# Patient Record
Sex: MALE | Race: BLACK OR AFRICAN AMERICAN | NOT HISPANIC OR LATINO | URBAN - METROPOLITAN AREA
[De-identification: names, ages, dates, MRNs, and addresses within clinical notes are randomized per-mention and may not be internally consistent; named-entity substitution may affect disease eponyms.]

---
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
## 2020-11-03 ENCOUNTER — EMERGENCY (EMERGENCY)
Facility: HOSPITAL | Age: 20
LOS: 0 days | Discharge: HOME | End: 2020-11-03
Attending: EMERGENCY MEDICINE
Payer: MEDICAID

## 2020-11-03 VITALS — SYSTOLIC BLOOD PRESSURE: 157 MMHG | DIASTOLIC BLOOD PRESSURE: 95 MMHG | HEART RATE: 69 BPM

## 2020-11-03 VITALS — WEIGHT: 212.08 LBS | HEIGHT: 69 IN

## 2020-11-03 DIAGNOSIS — R07.89 OTHER CHEST PAIN: ICD-10-CM

## 2020-11-03 DIAGNOSIS — I10 ESSENTIAL (PRIMARY) HYPERTENSION: ICD-10-CM

## 2020-11-03 DIAGNOSIS — G56.21 LESION OF ULNAR NERVE, RIGHT UPPER LIMB: ICD-10-CM

## 2020-11-03 DIAGNOSIS — R07.9 CHEST PAIN, UNSPECIFIED: ICD-10-CM

## 2020-11-03 LAB
ALBUMIN SERPL ELPH-MCNC: 3.6 G/DL — SIGNIFICANT CHANGE UP (ref 3.3–5)
ALP SERPL-CCNC: 53 U/L — SIGNIFICANT CHANGE UP (ref 40–120)
ALT FLD-CCNC: 24 U/L — SIGNIFICANT CHANGE UP (ref 12–78)
ANION GAP SERPL CALC-SCNC: 5 MMOL/L — SIGNIFICANT CHANGE UP (ref 5–17)
APTT BLD: 31.6 SEC — SIGNIFICANT CHANGE UP (ref 27.5–35.5)
AST SERPL-CCNC: 17 U/L — SIGNIFICANT CHANGE UP (ref 15–37)
BASOPHILS # BLD AUTO: 0.02 K/UL — SIGNIFICANT CHANGE UP (ref 0–0.2)
BASOPHILS NFR BLD AUTO: 0.3 % — SIGNIFICANT CHANGE UP (ref 0–2)
BILIRUB SERPL-MCNC: 0.3 MG/DL — SIGNIFICANT CHANGE UP (ref 0.2–1.2)
BUN SERPL-MCNC: 12 MG/DL — SIGNIFICANT CHANGE UP (ref 7–23)
CALCIUM SERPL-MCNC: 8.9 MG/DL — SIGNIFICANT CHANGE UP (ref 8.5–10.1)
CHLORIDE SERPL-SCNC: 106 MMOL/L — SIGNIFICANT CHANGE UP (ref 96–108)
CO2 SERPL-SCNC: 29 MMOL/L — SIGNIFICANT CHANGE UP (ref 22–31)
CREAT SERPL-MCNC: 1.13 MG/DL — SIGNIFICANT CHANGE UP (ref 0.5–1.3)
EOSINOPHIL # BLD AUTO: 0.1 K/UL — SIGNIFICANT CHANGE UP (ref 0–0.5)
EOSINOPHIL NFR BLD AUTO: 1.5 % — SIGNIFICANT CHANGE UP (ref 0–6)
GLUCOSE SERPL-MCNC: 94 MG/DL — SIGNIFICANT CHANGE UP (ref 70–99)
HCT VFR BLD CALC: 38.5 % — LOW (ref 39–50)
HGB BLD-MCNC: 12.6 G/DL — LOW (ref 13–17)
IMM GRANULOCYTES NFR BLD AUTO: 0.3 % — SIGNIFICANT CHANGE UP (ref 0–1.5)
INR BLD: 1.17 RATIO — HIGH (ref 0.88–1.16)
LIDOCAIN IGE QN: 104 U/L — SIGNIFICANT CHANGE UP (ref 73–393)
LYMPHOCYTES # BLD AUTO: 1.53 K/UL — SIGNIFICANT CHANGE UP (ref 1–3.3)
LYMPHOCYTES # BLD AUTO: 23.6 % — SIGNIFICANT CHANGE UP (ref 13–44)
MCHC RBC-ENTMCNC: 28.5 PG — SIGNIFICANT CHANGE UP (ref 27–34)
MCHC RBC-ENTMCNC: 32.7 GM/DL — SIGNIFICANT CHANGE UP (ref 32–36)
MCV RBC AUTO: 87.1 FL — SIGNIFICANT CHANGE UP (ref 80–100)
MONOCYTES # BLD AUTO: 0.43 K/UL — SIGNIFICANT CHANGE UP (ref 0–0.9)
MONOCYTES NFR BLD AUTO: 6.6 % — SIGNIFICANT CHANGE UP (ref 2–14)
NEUTROPHILS # BLD AUTO: 4.39 K/UL — SIGNIFICANT CHANGE UP (ref 1.8–7.4)
NEUTROPHILS NFR BLD AUTO: 67.7 % — SIGNIFICANT CHANGE UP (ref 43–77)
NRBC # BLD: 0 /100 WBCS — SIGNIFICANT CHANGE UP (ref 0–0)
NT-PROBNP SERPL-SCNC: 65 PG/ML — SIGNIFICANT CHANGE UP (ref 0–125)
PLATELET # BLD AUTO: 114 K/UL — LOW (ref 150–400)
POTASSIUM SERPL-MCNC: 3.6 MMOL/L — SIGNIFICANT CHANGE UP (ref 3.5–5.3)
POTASSIUM SERPL-SCNC: 3.6 MMOL/L — SIGNIFICANT CHANGE UP (ref 3.5–5.3)
PROT SERPL-MCNC: 7 GM/DL — SIGNIFICANT CHANGE UP (ref 6–8.3)
PROTHROM AB SERPL-ACNC: 13.5 SEC — SIGNIFICANT CHANGE UP (ref 10.6–13.6)
RBC # BLD: 4.42 M/UL — SIGNIFICANT CHANGE UP (ref 4.2–5.8)
RBC # FLD: 13.7 % — SIGNIFICANT CHANGE UP (ref 10.3–14.5)
SODIUM SERPL-SCNC: 140 MMOL/L — SIGNIFICANT CHANGE UP (ref 135–145)
TROPONIN I SERPL-MCNC: <.015 NG/ML — SIGNIFICANT CHANGE UP (ref 0.01–0.04)
WBC # BLD: 6.49 K/UL — SIGNIFICANT CHANGE UP (ref 3.8–10.5)
WBC # FLD AUTO: 6.49 K/UL — SIGNIFICANT CHANGE UP (ref 3.8–10.5)

## 2020-11-03 PROCEDURE — 93010 ELECTROCARDIOGRAM REPORT: CPT

## 2020-11-03 PROCEDURE — 99285 EMERGENCY DEPT VISIT HI MDM: CPT

## 2020-11-03 PROCEDURE — 71045 X-RAY EXAM CHEST 1 VIEW: CPT | Mod: 26

## 2020-11-03 RX ORDER — AMLODIPINE BESYLATE 2.5 MG/1
1 TABLET ORAL
Qty: 30 | Refills: 0
Start: 2020-11-03 | End: 2020-12-02

## 2020-11-03 NOTE — ED PROVIDER NOTE - CARE PROVIDER_API CALL
Karthik Love  INTERNAL MEDICINE  [address], [address]  [place] [address]  Phone: [phone]  Fax: [phone]  Follow Up Time: 4-6 Days    Zaire Mcintyre  CARDIOLOGY  [address] [facility] [address], [address] [place] [address]  Phone: [phone]  Fax: [phone]  Follow Up Time: 1-3 Days    Ian Kelly  ORTHOPAEDIC SURGERY  [address], [address]  [place] [address]  Phone: [phone]  Fax: [phone]  Follow Up Time: 4-6 Days

## 2020-11-03 NOTE — ED ADULT NURSE NOTE - OBJECTIVE STATEMENT
pt a&o x4, pt c.o right arm and right chest pain x 1 week. pt noticed new onset numbness tingling to right hand today after working out. pt states specifically in his right ulnar hand. pt denies falls, shortness of breath. pt admits to heavy lifting with arms. no other neuro deficets, bialterals enstaion intactr, no facial droop, no arm or leg drift. equal hand grasp

## 2020-11-03 NOTE — ED PROVIDER NOTE - PROVIDER TOKENS
PROVIDER:[TOKEN:[[id number]],FOLLOWUP:[4-6 Days]],PROVIDER:[TOKEN:[[id number]],FOLLOWUP:[1-3 Days]],PROVIDER:[TOKEN:[[id number]],FOLLOWUP:[4-6 Days]]

## 2020-11-03 NOTE — ED PROVIDER NOTE - CARE PROVIDERS DIRECT ADDRESSES
[email or web address]

## 2020-11-03 NOTE — ED PROVIDER NOTE - PROGRESS NOTE DETAILS
Pt chest pain free, ecg and labs wnl. Pt referred to pmd and cardiology, along with orthopedics. Pt ready for d/c. Return precautions provided.

## 2020-11-03 NOTE — ED PROVIDER NOTE - CLINICAL SUMMARY MEDICAL DECISION MAKING FREE TEXT BOX
DDx: Musculoskeletal chest pain from exercise, no risk factors for PE, ACS unlikely given presentation, heart score of 1, some ulnar nerve suppression from injury due to weight lifting   Plan: CBC, CMP, troponin, chest x-ray, EKG, ortho referral and reassess DDx: Musculoskeletal chest pain from exercise, no risk factors for PE, ACS unlikely given presentation, heart score of 1, some ulnar nerve compression from injury due to weight lifting   Plan: CBC, CMP, troponin, chest x-ray, EKG, ortho referral and reassess

## 2020-11-03 NOTE — ED PROVIDER NOTE - PATIENT PORTAL LINK FT
You can access the FollowMyHealth Patient Portal offered by [facility] by registering at the following website: [email or web address]. By joining [employer]’s FollowMyHealth portal, you will also be able to view your health information using other applications (apps) compatible with our system.

## 2020-11-03 NOTE — ED PROVIDER NOTE - NEUROLOGICAL, MLM
Alert and oriented, no focal deficits, no motor or sensory deficits. Cranial nerves 2-12 intact. Right hand palmar aspect of 4th and 5th finger has diminished sensation, otherwise neurovascularly intact

## 2020-11-03 NOTE — ED PROVIDER NOTE - OBJECTIVE STATEMENT
Pt is a 41 year old male w/PMH of HTN who presents to the ED for chest discomfort and right hand numbness. Reports CP for x1 week, worse w/movement, is a weight  and has been noticing pain when he moves. Denies fever/chills, cough, SOB, abdominal pain or N/V/D. Pt is CP free at rest. No hx DVT or PE. Has some right finger numbness as well, no weakness however.

## 2023-06-05 NOTE — ED PROVIDER NOTE - CONSTITUTIONAL, MLM
-I will order a TSH and CBC as part of her evaluation for chronic fatigue   normal... Well appearing, awake, alert, oriented to person, place, time/situation and in no apparent distress.

## 2023-06-24 ENCOUNTER — EMERGENCY (EMERGENCY)
Facility: HOSPITAL | Age: 23
LOS: 0 days | Discharge: HOME | End: 2023-06-24
Attending: EMERGENCY MEDICINE
Payer: MEDICAID

## 2023-06-24 VITALS
OXYGEN SATURATION: 98 % | RESPIRATION RATE: 18 BRPM | SYSTOLIC BLOOD PRESSURE: 113 MMHG | WEIGHT: 142.42 LBS | TEMPERATURE: 98 F | DIASTOLIC BLOOD PRESSURE: 61 MMHG | HEART RATE: 72 BPM | HEIGHT: 68 IN

## 2023-06-24 VITALS — HEART RATE: 85 BPM | SYSTOLIC BLOOD PRESSURE: 105 MMHG | DIASTOLIC BLOOD PRESSURE: 58 MMHG

## 2023-06-24 DIAGNOSIS — F19.10 OTHER PSYCHOACTIVE SUBSTANCE ABUSE, UNCOMPLICATED: ICD-10-CM

## 2023-06-24 DIAGNOSIS — Z00.00 ENCOUNTER FOR GENERAL ADULT MEDICAL EXAMINATION WITHOUT ABNORMAL FINDINGS: ICD-10-CM

## 2023-06-24 DIAGNOSIS — F25.9 SCHIZOAFFECTIVE DISORDER, UNSPECIFIED: ICD-10-CM

## 2023-06-24 DIAGNOSIS — F32.A DEPRESSION, UNSPECIFIED: ICD-10-CM

## 2023-06-24 DIAGNOSIS — F43.20 ADJUSTMENT DISORDER, UNSPECIFIED: ICD-10-CM

## 2023-06-24 PROCEDURE — 90792 PSYCH DIAG EVAL W/MED SRVCS: CPT

## 2023-06-24 PROCEDURE — 99284 EMERGENCY DEPT VISIT MOD MDM: CPT

## 2023-06-24 PROCEDURE — 99285 EMERGENCY DEPT VISIT HI MDM: CPT

## 2023-06-24 NOTE — ED PROVIDER NOTE - PATIENT PORTAL LINK FT
You can access the FollowMyHealth Patient Portal offered by [facility] by registering at the following website: [email or web address]. By joining [employer]’s FollowMyHealth portal, you will also be able to view your health information using other applications (apps) compatible with our system.

## 2023-06-24 NOTE — ED BEHAVIORAL HEALTH ASSESSMENT NOTE - NS ED BHA REVIEW OF ED CHART AVAILABLE LABS REVIEWED
Spoke with mom, patient started with runny nose on Sunday, developed congestion and abdominal breathing today, fatigue, afebrile, no wheezing.  Appointment scheduled.   None available

## 2023-06-24 NOTE — ED ADULT TRIAGE NOTE - CHIEF COMPLAINT QUOTE
Pt presents to ED for psych evaluation. Pt states he is "depressed for the last 5 years. Pt denies suicidal/homicidal ideation and alcohol use. Pt endorses hallucinations and "hearing voices". Pt reports drug use (crystal meth). 1:1 initiated in triage Pt presents to ED for psych evaluation. Pt states he is "depressed for the last 5 years." Pt denies suicidal/homicidal ideation and alcohol use. Pt endorses hallucinations and "hearing voices". Pt reports drug use (crystal meth). 1:1 initiated in triage

## 2023-06-24 NOTE — ED PROVIDER NOTE - PROGRESS NOTE DETAILS
EP: Patient was evaluated by psychiatry service and cleared for discharge home.  He denies any SI HI, wants to go home and  his daughter.

## 2023-06-24 NOTE — ED BEHAVIORAL HEALTH ASSESSMENT NOTE - HPI (INCLUDE ILLNESS QUALITY, SEVERITY, DURATION, TIMING, CONTEXT, MODIFYING FACTORS, ASSOCIATED SIGNS AND SYMPTOMS)
Patient is a 22? 31? yo M, single, unclear living situation, with psych history of polysubstance misuse (per chart has used meth, MJ, opioids), substance-induced mood disorder vs schizoaffective disorder, per PSDONNELL is a high utilizer of inpatient and ER services, including multiple ED visits in which he initially expresses active SI and then later retracts, numerous psych admissions, per records has h/o 2 remote SA by hanging and drinking bleach, PMH significant for HIV but has a documented h/o poor adherence with care, as well as suspected hyperthyroidism, hx of having ACT/AOT. To note, patient has previously been filed under Konrad Goncalves MRN [id number].     Upon approach, patient is sitting in chair comfortably, eating lunch. Patient is alert and oriented and engages with interviewer. Patient immediately asks interviewer "I need to go and pick my daughter up. I don't want to hurt myself anymore." Patient is cooperative and answers all questions appropriately. Patient denies any current passive or active suicidal ideation, intent or plan and denies any homicidal ideation. Patient denies any persecutory delusions and denies any auditory or visual hallucinations. Patient reports he follows with doctor at [facility] for HESTER medication. Does not recall which HESTER. Denies symptoms of depression, denies depressed mood or anhedonia. Denies symptoms of anxiety.

## 2023-06-24 NOTE — ED ADULT NURSE NOTE - NSFALLUNIVINTERV_ED_ALL_ED
Bed/Stretcher in lowest position, wheels locked, appropriate side rails in place/Call bell, personal items and telephone in reach/Instruct patient to call for assistance before getting out of bed/chair/stretcher/Non-slip footwear applied when patient is off stretcher/[place] to call system/Physically safe environment - no spills, clutter or unnecessary equipment/Purposeful proactive rounding/Room/bathroom lighting operational, light cord in reach

## 2023-06-24 NOTE — ED BEHAVIORAL HEALTH ASSESSMENT NOTE - DESCRIPTION
in good behavioral control per chart view, hx of HIV noncompliant with medications; suspected for hyperthyroidism unclear living situation, reports he has a daughter

## 2023-06-24 NOTE — ED BEHAVIORAL HEALTH ASSESSMENT NOTE - CURRENT MEDICATION
patient reports does not take any oral medications, gets HESTER at [facility] but can not recall which one or dosing

## 2023-06-24 NOTE — ED PROVIDER NOTE - NSFOLLOWUPCLINICS_GEN_ALL_ED_FT
[facility]  OP Mental Health  [address] [place] [id number]  Phone: [phone]  Fax:   Follow Up Time: 1-3 Days

## 2023-06-24 NOTE — ED BEHAVIORAL HEALTH ASSESSMENT NOTE - SAFETY PLAN ADDT'L DETAILS
Safety plan discussed with.../Education provided regarding environmental safety / lethal means restriction/Provision of National Suicide Prevention Lifeline 0-959-297-RHKW (8833)

## 2023-06-24 NOTE — ED PROVIDER NOTE - OBJECTIVE STATEMENT
21 yo M with pmhx of depression presenting for evaluation of depressive symptoms. States he has been cutting himself. States his family is mad at him because he is rich and they envy him. Endorses in taking crystal meth.

## 2023-06-24 NOTE — ED ADULT NURSE NOTE - CHIEF COMPLAINT QUOTE
Pt presents to ED for psych evaluation. Pt states he is "depressed for the last 5 years." Pt denies suicidal/homicidal ideation and alcohol use. Pt endorses hallucinations and "hearing voices". Pt reports drug use (crystal meth). 1:1 initiated in triage

## 2023-06-24 NOTE — ED BEHAVIORAL HEALTH ASSESSMENT NOTE - RISK ASSESSMENT
Protective factors include social support, parenthood, seeking out treatment/hopefullness.   Modifiable risk factors include psychiatric illness, substance use/intoxication, living alone/single  Static risk factors include male gender, prior SA

## 2023-06-24 NOTE — ED PROVIDER NOTE - NS ED ATTENDING STATEMENT MOD
This was a shared visit with the FRANNY. I reviewed and verified the documentation and independently performed the documented:

## 2023-06-24 NOTE — ED PROVIDER NOTE - CLINICAL SUMMARY MEDICAL DECISION MAKING FREE TEXT BOX
22-year-old man with substance abuse and schizoaffective disorder presents for evaluation of feeling depressed.  Patient was fed in ED, slept, was evaluated by psychiatry service.  he denies any complaints at present, cleared by psychiatry for discharge home.

## 2023-06-24 NOTE — ED PROVIDER NOTE - ATTENDING APP SHARED VISIT CONTRIBUTION OF CARE
22-year-old male past medical history of psych disorder and polysubstance abuse presenting to the ED expressing feeling depressed. Patient appears well, denies any physical complaints. Well-appearing young male sitting on a chair in no acute distress, cooperative.  Will obtain psychiatry consult, dispo as per psychiatry.  Patient is amenable to plan.

## 2025-07-08 NOTE — ED PROVIDER NOTE - INTERNATIONAL TRAVEL COUNTRIES
HPI:  Chief Complaint   Patient presents with    Office Visit    Lesion     This patient was sent for consultation by Thom Gamble MD for evaluation of generalized skin lesions. Impression and recommendations are in the note below. The note will be forwarded to the referring provider.    Patient presents for spots of concern on the back that itches; skin tags on the neck, and lesion on the right cheek; left temple and bumps he has developed all over. He reports having a lot of sunburns with no sun protection.     Past Medical History:  Past Medical History:   Diagnosis Date    Allergy     Arthritis of left hip 11/08/2024    Bilateral hip pain 05/03/2024    Colon polyps 05/13/2019    Diabetes mellitus  (CMD)     Diverticulosis of colon 05/13/2019    Erectile dysfunction     Essential (primary) hypertension     Essential tremor     Other and unspecified hyperlipidemia     Poison ivy dermatitis     12/2020    RAD (reactive airway disease) (CMD)     Sleep apnea     no CPAP used       Medications:  No outpatient medications have been marked as taking for the 7/8/25 encounter (Office Visit) with Schuyler Villafana MD.       Allergies:  ALLERGIES:   Allergen Reactions    Seasonal        Social History:  Social History     Tobacco Use   Smoking Status Never    Passive exposure: Never   Smokeless Tobacco Former       Exam:  Pleasant, normal affect, no acute distress, well-developed, well-nourished, alert and oriented x3  Conjunctiva and lids normal  Inspection of the skin of the head, neck, and bilateral upper extremities was performed and the areas were found to be normal except for the following findings:     See impression and plan for other exam findings     Impression/Plan:  Seborrheic keratoses  Type of problem: Self limited or minor problem: This is a problem that runs a definite and prescribed course, is transient  in nature, and is not likely to permanently alter health status.  Exam: Multiple stuck-on, waxy-brown  keratotic papules and plaques scattered on the torso and extremities.  Plan:  Discussed benign nature of this condition  Reassurance provided     Lentigines  Type of problem: Self limited or minor problem: This is a problem that runs a definite and prescribed course, is transient  in nature, and is not likely to permanently alter health status.  Exam: multiple variegated light to dark brown irregularly-shaped macules without pigment network on sun-exposed areas  Plan:  Discussed nature of this condition and relation to sun exposure  Reassurance provided, no treatment needed    Onychomycosis   Type of problem: Chronic illness with exacerbation, progression, or side effects of treatment: This is a problem with an expected duration of at least a year or until the death of the patient. It is acutely worsening, poorly controlled or progressing with an intent to control progression and requiring additional supportive care or requiring attention to treatment for side effects, but that does not require consideration of hospital level of care.  Exam: Yellow thickening and subungual debris of the toenail  Plan:  Discussed the chronic recurrent nature of this condition  Discussed risks/benefits/alternatives to treatment  Discussed terbinafine 250 mg PO daily for 3 months, patient declines at this time  Recommend OTC lamisil cream    Multiple benign nevi  Type of problem: Self limited or minor problem: This is a problem that runs a definite and prescribed course, is transient  in nature, and is not likely to permanently alter health status.  Exam: Multiple symmetric pink and brown macules and papules scattered on the torso and extremities.  Plan:  Discussed benign nature of this condition  Reassurance provided  Recommended broad spectrum SPF 30+ sunscreen to be used daily    Skin Tags, multiple acquired  Type of problem: Self limited or minor problem: This is a problem that runs a definite and prescribed course, is transient   in nature, and is not likely to permanently alter health status.  Exam: multiple pedunculated skin-colored papules on the neck.    Plan:  Discussed the nature of this condition  Reassurance provided, no treatment needed at this time    RTC 2-3 years for FBSE or sooner PRN    Medical Decision Making:  Problems: Moderate: 1 or more chronic illnesses with exacerbation, progression, or side effects of treatment  Data: Straightforward: Minimal or None  Risk: Moderate: Prescription drug management    I Liana Pickard MA am acting as a scribe for Schuyler Villafana MD and attest that Schuyler Villafana MD was present and performed this service and the information documented was at the direction of Schuyler Villafana MD.  Liana Pickard MA 7/8/2025 12:53 PM    I Schuyler Villafana MD, have personally performed the service and have reviewed and verified the scribes entries.  Schuyler Villafana MD 7/8/2025 12:53 PM    Schuyler Villafana MD  Attending Physician, Dermatology       [place]